# Patient Record
Sex: FEMALE | Race: WHITE | URBAN - METROPOLITAN AREA
[De-identification: names, ages, dates, MRNs, and addresses within clinical notes are randomized per-mention and may not be internally consistent; named-entity substitution may affect disease eponyms.]

---

## 2017-10-18 ENCOUNTER — TRANSFERRED RECORDS (OUTPATIENT)
Dept: HEALTH INFORMATION MANAGEMENT | Facility: CLINIC | Age: 67
End: 2017-10-18

## 2017-10-20 ENCOUNTER — MEDICAL CORRESPONDENCE (OUTPATIENT)
Dept: HEALTH INFORMATION MANAGEMENT | Facility: CLINIC | Age: 67
End: 2017-10-20

## 2017-10-20 DIAGNOSIS — Z13.5 ENCOUNTER FOR SCREENING FOR EYE AND EAR DISORDERS: Primary | ICD-10-CM

## 2017-11-21 ENCOUNTER — ALLIED HEALTH/NURSE VISIT (OUTPATIENT)
Dept: OPHTHALMOLOGY | Facility: CLINIC | Age: 67
End: 2017-11-21
Attending: OPHTHALMOLOGY
Payer: MEDICARE

## 2017-11-21 DIAGNOSIS — H35.53 ROD-CONE DYSTROPHY: Primary | ICD-10-CM

## 2017-11-21 DIAGNOSIS — Z13.5 ENCOUNTER FOR SCREENING FOR EYE AND EAR DISORDERS: ICD-10-CM

## 2017-11-21 PROCEDURE — 92275 FFERG OU (BOTH EYES): CPT | Mod: ZF

## 2017-11-21 PROCEDURE — 40000269 ZZH STATISTIC NO CHARGE FACILITY FEE: Mod: ZF

## 2017-11-21 RX ORDER — EPINEPHRINE 0.3 MG/.3ML
0.3 INJECTION SUBCUTANEOUS PRN
COMMUNITY

## 2017-11-21 RX ORDER — ASCORBIC ACID 500 MG
100 TABLET ORAL DAILY
COMMUNITY

## 2017-11-21 RX ORDER — VITAMIN E 268 MG
400 CAPSULE ORAL DAILY
COMMUNITY

## 2017-11-21 RX ORDER — LEVOTHYROXINE SODIUM 75 UG/1
75 TABLET ORAL DAILY
COMMUNITY

## 2017-11-21 RX ORDER — ROSUVASTATIN CALCIUM 20 MG/1
1 TABLET, COATED ORAL DAILY
COMMUNITY
Start: 2017-06-02

## 2017-11-21 RX ORDER — ZINC GLUCONATE 50 MG
50 TABLET ORAL DAILY
COMMUNITY

## 2017-11-21 ASSESSMENT — VISUAL ACUITY
OS_SC: 20/50
OD_SC: 20/30
OD_SC+: -2
METHOD: SNELLEN - LINEAR
OS_SC+: -2

## 2017-11-21 NOTE — NURSING NOTE
"[facility].  Initially referred by Dr. Guadalupe Kasper/[facility] to [facility] for decreased vision left eye after CE/IOL and Yag.  Pt states yearly exams w/Dr. Kasper.  Vision equal and very nearsighted prior to CE/IOL.  Right eye done first w/\"pristine\" vision; left eye never w/good vision after CE.  Uses only OTC readers at this time.  Since CE, left eye w/halos around lights that are bothersome.  Also, since AUBREE w/Dr. Rivera 10-18, pt has noted floaters right eye that are bothersome; floaters tend to clump and are more frequently seen and seen for longer duration.  No assoc pain.  Pt states there is some concern for borderline glaucoma.  + Arthritis x20+ yrs.  S/P double mastectomy w/reconstruction for breast cancer 2011; S/P radiation and chemo tx.   Dr. Kasper has suggested that pt see Dr. Johnson about ?IOL x-chg? pending ERG report.  No f/u appt yet scheduled w/any eye care.  Signed up for [employer] today.  Will await results.  "

## 2017-11-21 NOTE — PROGRESS NOTES
"2017    STANDARD ERG REPORT    Referring:  Dr. MEGHAN Rivera  CC: Dr. Guadalupe Kasper    RE:  Ese Everett  MRN:  [id number]  :  1950    ERG Date:  2017    CLINICAL HISTORY:   Ese Everett is a 67 year old year-old patient referred by Dr. MEGHAN Rivera/[facility]ina [facility] for ffERG.  Initially referred by Dr. Guadalupe Kasper/[facility] to [facility] for decreased vision left eye after CE/IOL and Yag. Vision equal and very nearsighted prior to CE/IOL.  Right eye done first w/\"pristine\" vision; left eye never w/good vision after CE.  Uses only OTC readers at this time.  Since CE, left eye w/halos around lights that are bothersome.  Also, since AUBREE w/Dr. Rivera 10-18, pt has noted floaters right eye that are bothersome; floaters tend to clump and are more frequently seen.  No associated pain.  The patient states there is some concern for borderline glaucoma.  + Arthritis x20+ yrs.  S/P double mastectomy w/reconstruction for breast cancer ; S/P radiation and chemo tx.       IMPRESSION:  1. likely Normal electroretinogram (ERG)     2. No significant anant or cone photoreceptor abnormalities in either eye                    Visual Acuity Right Eye : 20/30-2, PHNI      w/o gls & IOL    Visual Acuity Left Eye : 20/50-2, PHNI      w/o gls & IOL                     ALL AVERAGED  Data for Full-Field ERG Right Eye   Left Eye    Dark-Adapted Patient Normal Patient   0.01 ERG (anant) amplitude( v) 249 59.1-302.6 276   0.01 ERG (anant) implicit time(ms) 97.3 69.7-108.3 85.7   MMMMM      3.0 ERG (combined) a-wave amplitude( v) -179 -207.0 to -59.6 -208   3.0 ERG (combined) a-wave implicit time(ms) 15.8 14.2-21.4 15.8   3.0 ERG (combined) b-wave amplitude( v) 290 99.6-401.4 363   3.0 ERG (combined) b-wave implicit time(ms) 49.9 30.1-54.2 49.9   MMMMM      3.0 Oscillatory Potentials   Present     Light-Adapted      3.0 Flicker (30-Hz) amplitude( v) 86 71.8-121.8 98   3.0 Flicker (30-Hz) implicit " time(ms) 28.3 19.4-34.3 28.3         3.0 ERG (cone) a-wave amplitude( v) -20 -50.5 to -17.3 -29   3.0 ERG (cone) a-wave implicit time(ms) 15 15.1-19.2 14.1   3.0 ERG (cone) b-wave amplitude( v) 94 80.8-168.1 117   3.0 ERG (cone) b-wave implicit time(ms) 30.8 25.3-33.3 30.8          *=manipulated cursors    INTERPRETATION:      The electroretinogram was performed according to ISCEV standards using [employer] E3 system and DTL fiber recording electrodes. The patient tolerated the testing well. The waveforms are fairly reproducible and well formed. The responses in between both eyes were similar.  The normative values provided above represent the 95% confidence limits for a normal individual the age of the patient. The patient s responses are averaged.  In scotopic conditions, the anant specific responses were normal in both eyes.  The maximal response a-wave and b-wave responses were essentially normal and the implicit time was normal in both eyes.   In light adapted conditions, the 30-Hz flicker response has a normal amplitude and normal implicit time in both eyes. The single photopic flash response are normal. There is mild decreased on the implicit time in the single a-cone photoreceptor responses both eyes of unclear significance    Conclusion: This represents a likely normal electroretinogram. There is no evidence of significant anant or cone photoreceptor abnormalities in either eye. There is decreased on the implicit time in the single a-cone photoreceptor response of unclear significance. This is a nonspecific finding. Certainly, there is no significant loss of anant or cone photoreceptors. Clinical correlation is recommended.       I would recommend a repeat electroretinogram in a couple of years if there continues to be concern regarding a possible retinal dystrophy.     Osmin Morton, thank you for the opportunity to provide electrophysiologic services for this patient.  Please do not hesitate to call if there should  be any questions regarding these results.    Helena Capellan MD     Retina Service   Department of Ophthalmology and Visual Neurosciences   [facility]  Phone: [phone]   Fax: [phone]

## 2017-11-21 NOTE — MR AVS SNAPSHOT
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    -  1    Encounter for screening for eye and ear disorders           Follow-ups after your visit        Who to contact     Please call your clinic at [phone] to:    Ask questions about your health    Make or cancel appointments    Discuss your medicines    Learn about your test results    Speak to your doctor   If you have compliments or concerns about an experience at your clinic, or if you wish to file a complaint, please contact [facility] Patient Relations at [phone] or email us at [email or web address]         Additional Information About Your Visit        MyChart Information     [employer] gives you secure access to your electronic health record. If you see a primary care provider, you can also send messages to your care team and make appointments. If you have questions, please call your primary care clinic.  If you do not have a primary care provider, please call [phone] and they will assist you.      [employer] is an electronic gateway that provides easy, online access to your medical records. With [employer], you can request a clinic appointment, read your test results, renew a prescription or communicate with your care team.     To access your existing account, please contact your [facility] or call [phone] for assistance.        Care EveryWhere ID     This is your Care EveryWhere ID. This could be used by other organizations to access your [facility] medical records  [id number]         Blood Pressure from Last 3 Encounters:   No data found for BP    Weight from Last 3 Encounters:   No data found for Wt              We  Performed the Following     FFERG OU (both eyes)        Primary Care Provider Office Phone # Fax #    Matt Prakash [phone]       [facility] [address] [place] [address] DR QUEZADA [facility] [id number]        Equal Access to Services     BROOKE DAS : Hadii aad ku hadcelesteo Soomaali, waaxda luqadaha, qaybta kaalmada adeegyada, waxay jeanniein haydorethan ademey luis silvia ruiz. So [facility] [phone].    ATENCIÓN: Si habla español, tiene a norton disposición servicios gratuitos de asistencia lingüística. Llame al [phone].    We comply with applicable federal civil rights laws and [place] laws. We do not discriminate on the basis of race, color, national origin, age, disability, sex, sexual orientation, or gender identity.            Thank you!     Thank you for choosing EYE CLINIC  for your care. Our goal is always to provide you with excellent care. Hearing back from our patients is one way we can continue to improve our services. Please take a few minutes to complete the written survey that you may receive in the mail after your visit with us. Thank you!             Your Updated Medication List - Protect others around you: Learn how to safely use, store and throw away your medicines at www.disposemymeds.org.          This list is accurate as of: 11/21/17  1:22 PM.  Always use your most recent med list.                   Brand Name Dispense Instructions for use Diagnosis    ascorbic acid 500 MG tablet    VITAMIN C     Take 100 mg by mouth daily Pt states 100mg not 1000mg.        aspirin 81 MG tablet      Take 1 tablet by mouth daily        EPINEPHrine 0.3 MG/0.3ML injection 2-pack    EPIPEN/ADRENACLICK/or ANY BX GENERIC EQUIV     Inject 0.3 mg into the muscle as needed Allergy to gnats.        levothyroxine 75 MCG tablet    SYNTHROID/LEVOTHROID     Take 75 mcg by mouth daily        MAGNESIUM ASPARTATE PO      Take 1 tablet by mouth daily Pt states 400mg        MULTIVITAMIN PO      Take 1 tablet  by mouth daily        omeprazole 20 MG CR capsule    priLOSEC     Take 40 mg by mouth daily        PREDNISONE PO      Take 1 tablet by mouth as needed for cough Cough due to asthma.        rosuvastatin 20 MG tablet    CRESTOR     Take 1 tablet by mouth daily        VENTOLIN HFA IN      Inhale 2 puffs into the lungs daily        vitamin E 400 UNIT capsule      Take 400 Units by mouth daily        zinc gluconate 50 MG tablet      Take 50 mg by mouth daily

## 2017-11-21 NOTE — LETTER
"2017    STANDARD ERG REPORT    Referring:  NADIA Rivera MD      RE:  Ese Everett  MRN:  [id number]  :  1950    ERG Date:  2017    CLINICAL HISTORY:   Ese Everett is a 67-year-old patient referred by Dr. NADIA Rivera/[facility]ina [facility] for ffERG.  Initially referred by Dr. Guadalupe Kasper/[facility] to [facility] [place] for decreased vision left eye after CE/IOL and Yag. Vision equal and very nearsighted prior to CE/IOL.  Right eye done first w/\"pristine\" vision; left eye never w/good vision after CE.  Uses only OTC readers at this time.  Since CE, left eye w/halos around lights that are bothersome.  Also, since last eye exam w/Dr. Rivera 10-18, pt has noted floaters right eye that are bothersome; floaters tend to clump and are more frequently seen.  No associated pain.   The patient states there is some concern for borderline glaucoma.  + Arthritis x20+ yrs.  S/P double mastectomy w/reconstruction for breast cancer ; S/P radiation and chemo tx.       IMPRESSION: 1. Likely normal electroretinogram (ERG)     2. No significant anant or cone photoreceptor abnormalities in either eye                    Visual Acuity without glasses & IOL:    Right Eye: 20/30-2, PHNI         Left Eye:  20/50-2, PHNI                                          ALL AVERAGED  Data for Full-Field ERG Right Eye   Left Eye    Dark-Adapted Patient Normal Patient   0.01 ERG (anant) amplitude( v) 249 59.1-302.6 276   0.01 ERG (anant) implicit time(ms) 97.3 69.7-108.3 85.7   MMMMM      3.0 ERG (combined) a-wave amplitude( v) -179 -207.0 to -59.6 -208   3.0 ERG (combined) a-wave implicit time(ms) 15.8 14.2-21.4 15.8   3.0 ERG (combined) b-wave amplitude( v) 290 99.6-401.4 363   3.0 ERG (combined) b-wave implicit time(ms) 49.9 30.1-54.2 49.9   MMMMM      3.0 Oscillatory Potentials   Present     Light-Adapted      3.0 Flicker (30-Hz) amplitude( v) 86 71.8-121.8 98   3.0 Flicker (30-Hz) implicit time(ms) " 28.3 19.4-34.3 28.3         3.0 ERG (cone) a-wave amplitude( v) -20 -50.5 to -17.3 -29   3.0 ERG (cone) a-wave implicit time(ms) 15 15.1-19.2 14.1   3.0 ERG (cone) b-wave amplitude( v) 94 80.8-168.1 117   3.0 ERG (cone) b-wave implicit time(ms) 30.8 25.3-33.3 30.8                     *=manipulated cursors    INTERPRETATION:  The electroretinogram was performed according to ISCEV standards using the [employer] E3 system and DTL fiber recording electrodes. The patient tolerated the testing well. The waveforms are fairly reproducible and well formed. The responses in between both eyes were similar.  The normative values provided above represent the 95% confidence limits for a normal individual the age of the patient. The patient s responses are averaged.      In scotopic conditions, the anant specific responses were normal in both eyes. The maximal response a-wave and b-wave responses were essentially normal and the implicit time was normal in both eyes.     In light-dapted conditions, the 30-Hz flicker response has a normal amplitude and normal implicit time in both eyes. The single photopic flash responses are normal. There is mild decrease in the implicit time in the single a-wave cone photoreceptor responses, both eyes, of unclear significance    CONCLUSION:  This represents a likely normal electroretinogram. There is no evidence of significant anant or cone photoreceptor abnormalities in either eye. There is decrease in the implicit time in the single a-wave cone photoreceptor response of unclear significance. This is a nonspecific finding. Certainly, there is no significant loss of anant or cone photoreceptors. Clinical correlation is recommended. I would recommend a repeat electroretinogram in a couple of years if there continues to be concern regarding a possible retinal dystrophy.     Praveen, thank you for the opportunity to provide electrophysiologic services for this patient.  Please do not hesitate to call if there  should be any questions regarding these results.    Sincerely,    Helena Capellan MD     Retina Service   Department of Ophthalmology and Visual Neurosciences   [facility]  Phone: [phone]   Fax: [phone]        CC:  Dr. Guadalupe Kasper

## 2019-10-05 ENCOUNTER — HEALTH MAINTENANCE LETTER (OUTPATIENT)
Age: 69
End: 2019-10-05

## 2020-02-10 ENCOUNTER — HEALTH MAINTENANCE LETTER (OUTPATIENT)
Age: 70
End: 2020-02-10

## 2020-11-14 ENCOUNTER — HEALTH MAINTENANCE LETTER (OUTPATIENT)
Age: 70
End: 2020-11-14

## 2021-03-28 ENCOUNTER — HEALTH MAINTENANCE LETTER (OUTPATIENT)
Age: 71
End: 2021-03-28

## 2021-05-25 ENCOUNTER — RECORDS - HEALTHEAST (OUTPATIENT)
Dept: ADMINISTRATIVE | Facility: CLINIC | Age: 71
End: 2021-05-25

## 2021-09-12 ENCOUNTER — HEALTH MAINTENANCE LETTER (OUTPATIENT)
Age: 71
End: 2021-09-12

## 2021-11-07 ENCOUNTER — HEALTH MAINTENANCE LETTER (OUTPATIENT)
Age: 71
End: 2021-11-07

## 2022-04-24 ENCOUNTER — HEALTH MAINTENANCE LETTER (OUTPATIENT)
Age: 72
End: 2022-04-24

## 2022-11-19 ENCOUNTER — HEALTH MAINTENANCE LETTER (OUTPATIENT)
Age: 72
End: 2022-11-19

## 2023-06-01 ENCOUNTER — HEALTH MAINTENANCE LETTER (OUTPATIENT)
Age: 73
End: 2023-06-01

## 2023-11-18 ENCOUNTER — HEALTH MAINTENANCE LETTER (OUTPATIENT)
Age: 73
End: 2023-11-18